# Patient Record
(demographics unavailable — no encounter records)

---

## 2025-05-01 NOTE — PHYSICAL EXAM
[Normal] : no acute distress, well nourished, well developed and well-appearing [de-identified] : Cervical spinous process point tenderness at the higher levels of her C-spine.  Cervical flexion extension does not provoke Lhermitte's phenomenon though she notes pain radiating to her mid back in flexion.  Spurling test negative for cervical radiculopathy.   strength is reduced bilaterally, worse on the right. [de-identified] : Patient points to her right trapezius as a source for pain.  Passive abduction external rotation is mildly uncomfortable but does not appear to be a primary pain generator. Patient endorses pain with logroll of the bilateral lower extremities though straight leg raise does not clearly provoke sciatica.Bilateral trochanteric tenderness. [de-identified] : Symmetrically diminished reflexes in bilateral lower extremities.  Symmetric sensation bilaterally in her feet in the L4 L5-S1 distributions.  Some proximal improvement in monofilament sensitivity.

## 2025-05-01 NOTE — HEALTH RISK ASSESSMENT
[No] : In the past 12 months have you used drugs other than those required for medical reasons? No [No falls in past year] : Patient reported no falls in the past year [0] : 2) Feeling down, depressed, or hopeless: Not at all (0) [PHQ-2 Negative - No further assessment needed] : PHQ-2 Negative - No further assessment needed [Never] : Never [Patient reported mammogram was normal] : Patient reported mammogram was normal [Patient reported PAP Smear was normal] : Patient reported PAP Smear was normal [HIV Test offered] : HIV Test offered [Hepatitis C test offered] : Hepatitis C test offered [None] : None [With Family] : lives with family [Retired] : retired [High School] : high school [] :  [Feels Safe at Home] : Feels safe at home [Fully functional (bathing, dressing, toileting, transferring, walking, feeding)] : Fully functional (bathing, dressing, toileting, transferring, walking, feeding) [Fully functional (using the telephone, shopping, preparing meals, housekeeping, doing laundry, using] : Fully functional and needs no help or supervision to perform IADLs (using the telephone, shopping, preparing meals, housekeeping, doing laundry, using transportation, managing medications and managing finances) [Reports normal functional visual acuity (ie: able to read med bottle)] : Reports normal functional visual acuity [Smoke Detector] : smoke detector [Carbon Monoxide Detector] : carbon monoxide detector [Safety elements used in home] : safety elements used in home [Seat Belt] :  uses seat belt [Audit-CScore] : 0 [de-identified] : housework [de-identified] : ok [[facility]] : 10 [[id number]] : 0 [Change in mental status noted] : No change in mental status noted [Language] : denies difficulty with language [Behavior] : denies difficulty with behavior [Learning/Retaining New Information] : denies difficulty learning/retaining new information [Handling Complex Tasks] : denies difficulty handling complex tasks [Reasoning] : denies difficulty with reasoning [Spatial Ability and Orientation] : denies difficulty with spatial ability and orientation [Sexually Active] : not sexually active [High Risk Behavior] : no high risk behavior [Reports changes in hearing] : Reports no changes in hearing [Reports changes in vision] : Reports no changes in vision [Reports changes in dental health] : Reports no changes in dental health [Sunscreen] : does not use sunscreen [Travel to Developing Areas] : does not  travel to developing areas [TB Exposure] : is not being exposed to tuberculosis [Caregiver Concerns] : does not have caregiver concerns [MammogramComments] : overdue for all [ColonoscopyComments] : caridad [de-identified] : language. 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

## 2025-05-01 NOTE — ASSESSMENT
[Vaccines Reviewed] : Immunizations reviewed today. Please see immunization details in the vaccine log within the immunization flowsheet.  [FreeTextEntry1] : Most pleasant non-English-speaking female without medical care since 2018, with multipleChronic pain generators likely cervical, muscular, bursal, lumbar possibly hip SI joint, and several other untreated significant health care issues with potential for significant adverse impact on quality of life.  *Burning paresthesia. Central impingement vs peripheral neuropathy. Start with plain Lumbosacral w flex/ext,  Ig A1c hold off on fat pad biopsy. Ref Ortho spine. May need emg.  *B arm pain. Weak  strength. Pain in trapezius likely needs PT, but let Ortho eval first. Old MRI showed L c5/6 severe impingement, interval progression and/or Carpal tunnel likely. Ref Ortho spine, prob need update MRI and/or EMG. Did not order re imaging of Cspine or shoulder.  *Chronic Right elbow pain. Did not discuss today, nvr imaged. Start with inflammatory arthropathy work up. Examine on return.  *Cystic left thyroid nodule. Overdue for f/u, 2018 rpt did not classify in terms of PIRADS. U/S ordered.   *Cervical cancer screening.  *High risk HPV thin prep, 2016. . Overdue for pap. Will refer upon pt return. Saw Dr Carias in past.  *Severe hyperlipidemia. Nonsmoker, normotensive, but sedentary with prediabetes. Update with Lpa, discuss cardiology referral on rtn to scrn for subclin CAD.  *Chronic abdominal pain. Did not come up today, address at follow up. Fatty liver without cirrhosis on abd US 2017, no other findings of note. Given ppi in past, uncertain impact. Nvr EGD.  *Hepatic steatosis. CBC CMP for FIB4 to det if elastogram indicated.  *Chronic epigastric pain.  U/S 2017 fatty liver o/w unremarkable.  *Chronic daily headache. Saw neurology, prescribed TCA with improvement. Had patchy right mastoiditis on scan 2017, Dr Ellis did not feel treatment indicated, and indeed not called on f/u imaging. Palpate on rtn. Abnl head MRI with disproportionately prominent frontal small vessel disease findings, prompted murtaza re scan without findings to suggest demyelinating process, fortunately.  *Latent TB. Evidently treated.  *Osteopenia. DEXA  -1.5spine, -1. hip rec'd fosamax-> 2017 -0.5s/-0.2 hip. Pursue DEXA on rtn, check vit D levels.  *Prediabetes.  Weight up approximately 10 pounds since last seen in 2018.  Update A1c obtain urine albumin.  *Routine adult health maintenance. Vaccinations: Eligible for Tdap Prevnar 20 Shingrix.  Received a flu shot this year but no COVID booster. Colon cancer screening: Never.  Inquire as to family history.  Discussed colonoscopy versus Cologuard on return. Breast cancer screening: No family history of breast cancer.  Mammogram breast ultrasound ordered, last checked 7 years ago. Cervical cancer screening: HPV positive.  See above. Osteoporosis screening: See above. Update health screening labs. Return for exam report visit and continue to address numerous outstanding issues. Time 60 minutes

## 2025-05-01 NOTE — PHYSICAL EXAM
[Normal] : no acute distress, well nourished, well developed and well-appearing [de-identified] : Cervical spinous process point tenderness at the higher levels of her C-spine.  Cervical flexion extension does not provoke Lhermitte's phenomenon though she notes pain radiating to her mid back in flexion.  Spurling test negative for cervical radiculopathy.   strength is reduced bilaterally, worse on the right. [de-identified] : Patient points to her right trapezius as a source for pain.  Passive abduction external rotation is mildly uncomfortable but does not appear to be a primary pain generator. Patient endorses pain with logroll of the bilateral lower extremities though straight leg raise does not clearly provoke sciatica.Bilateral trochanteric tenderness. [de-identified] : Symmetrically diminished reflexes in bilateral lower extremities.  Symmetric sensation bilaterally in her feet in the L4 L5-S1 distributions.  Some proximal improvement in monofilament sensitivity.

## 2025-05-01 NOTE — HISTORY OF PRESENT ILLNESS
[FreeTextEntry1] : Neck pain back pain burning feet [de-identified] : Most pleasant 69-year-old non-English-speaking  female with history of bilateral cervical radiculopathy with severe left C5-C6 impingement on 2018 cervical MRI who comes as new patient to [facility] having regained insurance, after a 7-year absence from any medical care.  Her son serves as .  She reports neck, Right shoulder and low back pain.  The back pain is worsened with lumbar extension.  No saddle anesthesia incontinence.  The neck pain apparently radiates down both arms, prompted neuro MRI neck 2018.   Of concern she also notes burning in her feet, longstanding LBP and prediabetes.

## 2025-05-01 NOTE — HISTORY OF PRESENT ILLNESS
[FreeTextEntry1] : Neck pain back pain burning feet [de-identified] : Most pleasant 69-year-old non-English-speaking  female with history of bilateral cervical radiculopathy with severe left C5-C6 impingement on 2018 cervical MRI who comes as new patient to [facility] having regained insurance, after a 7-year absence from any medical care.  Her son serves as .  She reports neck, Right shoulder and low back pain.  The back pain is worsened with lumbar extension.  No saddle anesthesia incontinence.  The neck pain apparently radiates down both arms, prompted neuro MRI neck 2018.   Of concern she also notes burning in her feet, longstanding LBP and prediabetes.

## 2025-05-01 NOTE — HEALTH RISK ASSESSMENT
[No] : In the past 12 months have you used drugs other than those required for medical reasons? No [No falls in past year] : Patient reported no falls in the past year [0] : 2) Feeling down, depressed, or hopeless: Not at all (0) [PHQ-2 Negative - No further assessment needed] : PHQ-2 Negative - No further assessment needed [Never] : Never [Patient reported mammogram was normal] : Patient reported mammogram was normal [Patient reported PAP Smear was normal] : Patient reported PAP Smear was normal [HIV Test offered] : HIV Test offered [Hepatitis C test offered] : Hepatitis C test offered [None] : None [With Family] : lives with family [Retired] : retired [High School] : high school [] :  [Feels Safe at Home] : Feels safe at home [Fully functional (bathing, dressing, toileting, transferring, walking, feeding)] : Fully functional (bathing, dressing, toileting, transferring, walking, feeding) [Fully functional (using the telephone, shopping, preparing meals, housekeeping, doing laundry, using] : Fully functional and needs no help or supervision to perform IADLs (using the telephone, shopping, preparing meals, housekeeping, doing laundry, using transportation, managing medications and managing finances) [Reports normal functional visual acuity (ie: able to read med bottle)] : Reports normal functional visual acuity [Smoke Detector] : smoke detector [Carbon Monoxide Detector] : carbon monoxide detector [Safety elements used in home] : safety elements used in home [Seat Belt] :  uses seat belt [Audit-CScore] : 0 [de-identified] : housework [de-identified] : ok [[facility]] : 10 [AWB0Orsoc] : 0 [Change in mental status noted] : No change in mental status noted [Language] : denies difficulty with language [Behavior] : denies difficulty with behavior [Learning/Retaining New Information] : denies difficulty learning/retaining new information [Handling Complex Tasks] : denies difficulty handling complex tasks [Reasoning] : denies difficulty with reasoning [Spatial Ability and Orientation] : denies difficulty with spatial ability and orientation [Sexually Active] : not sexually active [High Risk Behavior] : no high risk behavior [Reports changes in hearing] : Reports no changes in hearing [Reports changes in vision] : Reports no changes in vision [Reports changes in dental health] : Reports no changes in dental health [Sunscreen] : does not use sunscreen [Travel to Developing Areas] : does not  travel to developing areas [TB Exposure] : is not being exposed to tuberculosis [Caregiver Concerns] : does not have caregiver concerns [MammogramComments] : overdue for all [ColonoscopyComments] : caridad [de-identified] : language. 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

## 2025-05-01 NOTE — PHYSICAL EXAM
[Normal] : no acute distress, well nourished, well developed and well-appearing [de-identified] : Cervical spinous process point tenderness at the higher levels of her C-spine.  Cervical flexion extension does not provoke Lhermitte's phenomenon though she notes pain radiating to her mid back in flexion.  Spurling test negative for cervical radiculopathy.   strength is reduced bilaterally, worse on the right. [de-identified] : Patient points to her right trapezius as a source for pain.  Passive abduction external rotation is mildly uncomfortable but does not appear to be a primary pain generator. Patient endorses pain with logroll of the bilateral lower extremities though straight leg raise does not clearly provoke sciatica.Bilateral trochanteric tenderness. [de-identified] : Symmetrically diminished reflexes in bilateral lower extremities.  Symmetric sensation bilaterally in her feet in the L4 L5-S1 distributions.  Some proximal improvement in monofilament sensitivity.

## 2025-05-01 NOTE — HISTORY OF PRESENT ILLNESS
[FreeTextEntry1] : Neck pain back pain burning feet [de-identified] : Most pleasant 69-year-old non-English-speaking  female with history of bilateral cervical radiculopathy with severe left C5-C6 impingement on 2018 cervical MRI who comes as new patient to [facility] having regained insurance, after a 7-year absence from any medical care.  Her son serves as .  She reports neck, Right shoulder and low back pain.  The back pain is worsened with lumbar extension.  No saddle anesthesia incontinence.  The neck pain apparently radiates down both arms, prompted neuro MRI neck 2018.   Of concern she also notes burning in her feet, longstanding LBP and prediabetes.

## 2025-05-01 NOTE — ASSESSMENT
[Vaccines Reviewed] : Immunizations reviewed today. Please see immunization details in the vaccine log within the immunization flowsheet.  [FreeTextEntry1] : Most pleasant non-English-speaking female without medical care since 2018, with multipleChronic pain generators likely cervical, muscular, bursal, lumbar possibly hip SI joint, and several other untreated significant health care issues with potential for significant adverse impact on quality of life.  *Burning paresthesia. Central impingement vs peripheral neuropathy. Start with plain Lumbosacral w flex/ext,  Ig A1c hold off on fat pad biopsy. Ref Ortho spine. May need emg.  *B arm pain. Weak  strength. Pain in trapezius likely needs PT, but let Ortho eval first. Old MRI showed L c5/6 severe impingement, interval progression and/or Carpal tunnel likely. Ref Ortho spine, prob need update MRI and/or EMG. Did not order re imaging of Cspine or shoulder.  *Chronic Right elbow pain. Did not discuss today, nvr imaged. Start with inflammatory arthropathy work up. Examine on return.  *Cystic left thyroid nodule. Overdue for f/u, 2018 rpt did not classify in terms of PIRADS. U/S ordered.   *Cervical cancer screening.  *High risk HPV thin prep, 2016. . Overdue for pap. Will refer upon pt return. Saw Dr Carias in past.  *Severe hyperlipidemia. Nonsmoker, normotensive, but sedentary with prediabetes. Update with Lpa, discuss cardiology referral on rtn to scrn for subclin CAD.  *Chronic abdominal pain. Did not come up today, address at follow up. Fatty liver without cirrhosis on abd US 2017, no other findings of note. Given ppi in past, uncertain impact. Nvr EGD.  *Hepatic steatosis. CBC CMP for FIB4 to det if elastogram indicated.  *Chronic epigastric pain.  U/S 2017 fatty liver o/w unremarkable.  *Chronic daily headache. Saw neurology, prescribed TCA with improvement. Had patchy right mastoiditis on scan 2017, Dr Ellis did not feel treatment indicated, and indeed not called on f/u imaging. Palpate on rtn. Abnl head MRI with disproportionately prominent frontal small vessel disease findings, prompted murtaza re scan without findings to suggest demyelinating process, fortunately.  *Latent TB. Evidently treated.  *Osteopenia. DEXA  -1.5spine, -1. hip rec'd fosamax-> 2017 -0.5s/-0.2 hip. Pursue DEXA on rtn, check vit D levels.  *Prediabetes.  Weight up approximately 10 pounds since last seen in 2018.  Update A1c obtain urine albumin.  *Routine adult health maintenance. Vaccinations: Eligible for Tdap Prevnar 20 Shingrix.  Received a flu shot this year but no COVID booster. Colon cancer screening: Never.  Inquire as to family history.  Discussed colonoscopy versus Cologuard on return. Breast cancer screening: No family history of breast cancer.  Mammogram breast ultrasound ordered, last checked 7 years ago. Cervical cancer screening: HPV positive.  See above. Osteoporosis screening: See above. Update health screening labs. Return for exam report visit and continue to address numerous outstanding issues. Time 60 minutes     Bactrim Counseling:  I discussed with the patient the risks of sulfa antibiotics including but not limited to GI upset, allergic reaction, drug rash, diarrhea, dizziness, photosensitivity, and yeast infections.  Rarely, more serious reactions can occur including but not limited to aplastic anemia, agranulocytosis, methemoglobinemia, blood dyscrasias, liver or kidney failure, lung infiltrates or desquamative/blistering drug rashes.

## 2025-05-01 NOTE — HEALTH RISK ASSESSMENT
[No] : In the past 12 months have you used drugs other than those required for medical reasons? No [No falls in past year] : Patient reported no falls in the past year [0] : 2) Feeling down, depressed, or hopeless: Not at all (0) [PHQ-2 Negative - No further assessment needed] : PHQ-2 Negative - No further assessment needed [Never] : Never [Patient reported mammogram was normal] : Patient reported mammogram was normal [Patient reported PAP Smear was normal] : Patient reported PAP Smear was normal [HIV Test offered] : HIV Test offered [Hepatitis C test offered] : Hepatitis C test offered [None] : None [With Family] : lives with family [Retired] : retired [High School] : high school [] :  [Feels Safe at Home] : Feels safe at home [Fully functional (bathing, dressing, toileting, transferring, walking, feeding)] : Fully functional (bathing, dressing, toileting, transferring, walking, feeding) [Fully functional (using the telephone, shopping, preparing meals, housekeeping, doing laundry, using] : Fully functional and needs no help or supervision to perform IADLs (using the telephone, shopping, preparing meals, housekeeping, doing laundry, using transportation, managing medications and managing finances) [Reports normal functional visual acuity (ie: able to read med bottle)] : Reports normal functional visual acuity [Smoke Detector] : smoke detector [Carbon Monoxide Detector] : carbon monoxide detector [Safety elements used in home] : safety elements used in home [Seat Belt] :  uses seat belt [Audit-CScore] : 0 [de-identified] : housework [de-identified] : ok [[facility]] : 10 [EXB1Mvlrm] : 0 [Change in mental status noted] : No change in mental status noted [Language] : denies difficulty with language [Behavior] : denies difficulty with behavior [Learning/Retaining New Information] : denies difficulty learning/retaining new information [Handling Complex Tasks] : denies difficulty handling complex tasks [Reasoning] : denies difficulty with reasoning [Spatial Ability and Orientation] : denies difficulty with spatial ability and orientation [Sexually Active] : not sexually active [High Risk Behavior] : no high risk behavior [Reports changes in hearing] : Reports no changes in hearing [Reports changes in vision] : Reports no changes in vision [Reports changes in dental health] : Reports no changes in dental health [Sunscreen] : does not use sunscreen [Travel to Developing Areas] : does not  travel to developing areas [TB Exposure] : is not being exposed to tuberculosis [Caregiver Concerns] : does not have caregiver concerns [MammogramComments] : overdue for all [ColonoscopyComments] : caridad [de-identified] : language. 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

## 2025-05-08 NOTE — CONSULT LETTER
[Dear  ___] : Dear  [unfilled], [Courtesy Letter:] : I had the pleasure of seeing your patient, [unfilled], in my office today. [Please see my note below.] : Please see my note below. [Sincerely,] : Sincerely, [FreeTextEntry2] : Claude Bridges, MD ([facility]) [FreeTextEntry3] : Oralia Gonzáles, YARI, PAAbiC Sr. Physician Assistant, Otolaryngology at [facility] Adjunct Clinical Instructor, Department of Physician Assistant Studies, [facility] [address] [address]

## 2025-05-08 NOTE — PROCEDURE
[Epistaxis] : evaluation of epistaxis [Topical Lidocaine] : topical lidocaine [Oxymetazoline HCl] : oxymetazoline HCl [Flexible Endoscope] : examined with the flexible endoscope [Serial Number: ___] : Serial Number: [unfilled] [Normal] : the nasal mucosa was normal [Nasal Septum Mucosa Bleeding Right] : bleeding on the right [Cauterized w/Silver Nitrate] : the bleeding was cauterized with silver nitrate [FreeTextEntry2] : Mild bleeding was seen in the right septum cauterized with silver nitrate.

## 2025-05-08 NOTE — PHYSICAL EXAM
[de-identified] : Rapid rhino in right nostril, inflated.  No excessive bleeding. [Midline] : trachea located in midline position [Normal] : no rashes

## 2025-05-08 NOTE — HISTORY OF PRESENT ILLNESS
[de-identified] : Ms. SALINAS is a 69-year-old female who presents for packing removal for recurrent epistaxis.  She is accompanied by her son, who reports that bleeding started this past Saturday. They presented to the ED, where bleeding was controlled.  This past Tuesday, May 6, she had another epistaxis episode and returned to the ED, where her right nostril was packed with rapid rhinoplasty.  She was seen by the med in the ED that day briefly to confirm hemostasis, and a follow-up appointment for today was arranged.  She denies any excessive bleeding since Tuesday and noted some discomfort in her right nostril due to the rapid rhinorrhea.

## 2025-05-13 NOTE — PHYSICAL EXAM
[Normal] : affect was normal and insight and judgment were intact [de-identified] : healing wound in right nares, no active bleeding.

## 2025-05-13 NOTE — ASSESSMENT
[FreeTextEntry1] : Most pleasant medically complicated non-English-speaking female without medical care since 2018, with multiple chronic pain generators likely cervical, muscular, bursal, lumbar possibly hip SI joint, and several other untreated significant health care issues (HLD HTN MAFLD PreDM, paresthesia, thyroid nodule) with potential for significant adverse impact on quality of life.  *Epistaxis.  Chronic NSAIDS for pain. Inconsistent history regarding allergic rhinitis history which shouldn't cause epistaxis. Plats adequate.  Wonder if labile hypertension, reactive vs primary, clearly was markedly elevated  while tachycardic as well, on both ED visits, now essentially normal on very small dose metoprolol.  TSH normal,  On Augmentin to reduce risk of infection, tolerating from a GI point of view.  No overt infection, extensive right nostril tissue injury from silver nitrate, possibly penetrating as 1 small spot to the left nares.  Patient wanted something for clear nasal drainage, indicated that we would not want to dry the area up or apply a steroid as it is trying to heal.  No consistent allergy history. Pheo work up, possible zios, and check coags.  *Hypertension,reactive versus essential. R/O PSVT. in contrast to blood pressure on both of our recent clinic visits, blood pressure in the ER was markedly elevated 297-208/ but was tachy at 110-124. See discussion under epistaxis. Will continue low dose beta blker, and given microalbuminuria, add losartan 25mg daily.  *Burning paresthesia. Central impingement vs peripheral neuropathy. Start with plain Lumbosacral w flex/ext, Ig polyclonal,  A1c 6.4%  hold off on fat pad biopsy. Has Ref Ortho spine. May need emg.  *B arm pain. Weak  strength. Pain in trapezius likely needs PT, but let Ortho eval first. Old MRI showed L c5/6 severe impingement, interval progression and/or Carpal tunnel likely. Ref Ortho spine, prob need update MRI and/or EMG. Did not order re imaging of Cspine or shoulder.  *Chronic Right elbow pain. Did not discuss today, nvr imaged. RF MELY CRP normal, isolated ESR 39 ?polyclonal gammaglobulinemia effect. Examine on return.  *Cystic left thyroid nodule. Overdue for f/u, 2018 rpt did not classify in terms of PIRADS. TSH normal. U/S ordered.  *Cervical cancer screening. *High risk HPV thin prep, 2016. . Overdue for pap. Will refer upon pt return. Saw Dr Carias in past.  *Severe hyperlipidemia likely long standing, untreated. Low Lpa. Nonsmoker, reactive hypertensive, but sedentary with prediabetes.Suggested cardiology referral to scrn for subclin CAD. Initiate high intensity statin.  *Chronic abdominal pain. Did not come up today, address at follow up. Fatty liver without cirrhosis on abd US 2017, no other findings of note. Given ppi in past, uncertain impact. Nvr EGD.  *Hepatic steatosis. FIB4 =2.1, will need elastogram, liver referral in future.  *Chronic epigastric pain. U/S 2017 fatty liver o/w unremarkable. Pursue further at follow up.  *Chronic daily headache. Saw neurology, prescribed TCA with improvement. Had patchy right mastoiditis on scan 2017, Dr Ellis did not feel treatment indicated, and indeed not called on f/u imaging. Palpate on rtn. Abnl head MRI with disproportionately prominent frontal small vessel disease findings, prompted murtaza re scan without findings to suggest demyelinating process, fortunately.  *Latent TB. Evidently treated.  *Osteopenia. DEXA  -1.5spine, -1. hip rec'd fosamax-> 2017 -0.5s/-0.2 hip. Pursue DEXA on rtn, asked to start 2000iu d3 daily,   *Prediabetes. Weight up approximately 10 pounds since last seen in 2018, 5.7 carloz to 6.4%. Has albuminuria. Start losartan 25mg daily  *Routine adult health maintenance. Vaccinations: Eligible for Tdap Prevnar 20 Shingrix. Received a flu shot this year but no COVID booster. Colon cancer screening: Never. Inquire as to family history. Discuss colonoscopy versus Cologuard on return. Breast cancer screening: No family history of breast cancer. Mammogram breast ultrasound ordered, last checked 7 years ago. Not yet scheduled. Cervical cancer screening: HPV positive. See above. Osteoporosis screening: See above. Updated health screening labs neg hbv hcv hiv. Return for exam report visit and continue to address numerous outstanding issues. Time 45min

## 2025-05-13 NOTE — PHYSICAL EXAM
[Normal] : affect was normal and insight and judgment were intact [de-identified] : healing wound in right nares, no active bleeding.

## 2025-05-13 NOTE — HISTORY OF PRESENT ILLNESS
[FreeTextEntry1] : ER follow-up.  Report visit. [de-identified] : Most pleasant 69-year-old non-English-speaking  female with history of bilateral cervical radiculopathy with severe left C5-C6 impingement on 2018 cervical MRI who recently established care after a 7-year absence from any medical care, who comes in for rpt visit and ED follow up.  Patient was seen in the ER for recurrent epistaxis on May 3, blood pressure 197/93, pulse 124.  EKG not done.  She was seen again for repeat May 6, blood pressure at that time 208/106 pulse again 110.  She was not having substantial pain from the nosebleed no antecedent trauma.  She has seen ENT in the nosebleed has been cauterized with silver nitrate after balloon compression failed to work.  She was started on low-dose metoprolol in the ER.  Her son serves as . At initial visit, patient reported She reports neck, Right shoulder and low back pain.  The back pain is worsened with lumbar extension.  No saddle anesthesia incontinence.  The neck pain apparently radiates down both arms, prompted neuro MRI neck 2018.   Of concern she also notes burning in her feet, longstanding LBP and prediabetes.

## 2025-05-14 NOTE — PROCEDURE
[Epistaxis] : evaluation of epistaxis [Topical Lidocaine] : topical lidocaine [Oxymetazoline HCl] : oxymetazoline HCl [Flexible Endoscope] : examined with the flexible endoscope [Serial Number: ___] : Serial Number: [unfilled] [Deviated to the Rt] : deviated to the right [Nasal Septum Mucosa Bleeding Right] : bleeding on the right [Cauterized w/Silver Nitrate] : the bleeding was cauterized with silver nitrate [Normal] : the nasopharynx was normal [FreeTextEntry2] : Mild bleeding seen in right anterior septum, cauterized with silver nitrate.  [de-identified] : Mild bleeding seen in middle meatus, suctioned, sprayed with Afrin

## 2025-05-14 NOTE — CONSULT LETTER
[Dear  ___] : Dear  [unfilled], [Courtesy Letter:] : I had the pleasure of seeing your patient, [unfilled], in my office today. [Please see my note below.] : Please see my note below. [Sincerely,] : Sincerely, [FreeTextEntry2] : Claude Bridges MD [FreeTextEntry3] : Oralia Gonzáles, YARI, PAAbiC Sr. Physician Assistant, Otolaryngology at [facility] Adjunct Clinical Instructor, Department of Physician Assistant Studies, [facility] [address] [address]

## 2025-05-14 NOTE — REASON FOR VISIT
[Subsequent Evaluation] : a subsequent evaluation for [FreeTextEntry2] : epistaxis and post-nasal drip [Family Member] : family member

## 2025-05-14 NOTE — PHYSICAL EXAM
[Nasal Endoscopy Performed] : nasal endoscopy was performed, see procedure section for findings [] : septum deviated to the right [Midline] : trachea located in midline position [Normal] : no rashes [de-identified] : Dried mucous in right nare- removed

## 2025-05-14 NOTE — HISTORY OF PRESENT ILLNESS
[de-identified] :  Ms. SALINAS is a 69-year-old female PMHx HTN who presents for subsequent evaluation of recurrent epistaxisis, she is accompanied by her son who is translating in Cymro. She has not had any episodes of epistaxis since last visit and has been using the saline spray which has helped with dryness. Noted some right sided facial presssure/pain, nasal congestion, and yellow post-nasal drip since rapid rhino was removed, but it is improving. She feels her right nostril is slightly blocked with mucous. Denies recent sick contact/illness, fever, chills, SOB, dizziness, headache.

## 2025-05-27 NOTE — PHYSICAL EXAM
[de-identified] : Patient is WDWN, alert, and in no acute distress. Breathing is unlabored. She is grossly oriented to person, place, and time.  She is accompanied by her daughter and using a .    Right Shoulder: Inspection/ Palpation: there is no tenderness, swelling, or deformities. Range of Motion: Full ROM with pain Strength: forward elevation, internal rotation, external rotation, adduction, and abduction are 5/5. Stability: no joint instability on provocative testing.  Full ROM of neck Right foot: Inspection/Palpation: No tenderness, edema, or deformities. Range of Motion: Full Stability: Joint stability is intact. Full range of motion in toes. Strength Numbness in little toe.   Right Hand -  Inspection/Palpation: Tenderness. Mild edema. No ecchymosis. No deformities. No discoloration.  Stability: No joint instability on provocative testing. Range of Motion: Strength:  middle and ring finger numbness  [de-identified] : ACC: [id number] EXAM: XR LS SPINE FLEX EXT MIN 4V ORDERED BY: CLAUDE CHITRA PACO  PROCEDURE DATE: 05/21/2025  INTERPRETATION: Radiographs of the lumbar spine CPT 03549  CLINICAL INFORMATION: Low back pain  TECHNIQUE: Frontal and lateral views of the lumbar spine, flexion and extension views, and lateral coned-down view of the lumbosacral junction were obtained.  FINDINGS: No previous examinations are available for review.  No gross vertebral body fracture is demonstrated. Significant degenerative changes with osteophyte formation, disc space narrowing, and endplate sclerosis are seen involving the L5-S1 level. There is no evidence for subluxation upon flexion or extension imaging. Lordosis is maintained in the neutral position. The sacroiliac joints appear symmetric bilaterally.  Incidental note is made of calcification of the distal abdominal aorta.  IMPRESSION: Degenerative changes noted at the L5-S1 level as noted above.   --- End of Report ---  SUNITA GALLEGOS MD; Attending Radiologist This document has been electronically signed. May 22 2025 10:08AM

## 2025-05-27 NOTE — DISCUSSION/SUMMARY
[de-identified] : The underlying pathophysiology was reviewed with the patient. XR films were reviewed with the patient. Discussed at length the nature of the patients condition. The shoulder, neck, low back symptoms are secondary to arthritis at the L5-S1 level.   Treatment options were discussed including; observation, NSAIDS, analgesics, EMG, physical therapy.   RX: Pt for the neck script give cervical and lumbar spine   The patient wishes to proceed with an EMG for rt hand and rt foot numbness. A script was given.  Patient was advised to try OTC medication and topical analgesics for pain management. I recommend that the patient utilize Tylenol, Advil, Aleve, Voltaren gel, Icy Hot, Biofreeze, Bengay, or 4% lidocaine patches.  All questions answered, understanding verbalized. Patient in agreement with plan of care. The patient can follow-up after EMG results. If the patient begins to experience any changes or severe exacerbation of her symptoms, she should reach out to me as soon as possible.

## 2025-05-27 NOTE — HISTORY OF PRESENT ILLNESS
Please let the patient to submit E-visit for adjusting the dose of her medication - as instructed in her last OV with me. Will need to know on the E-visit how is her ADHD and her current dosage of Strattera she is taking.                [de-identified] : Ms. MIN SALINAS is a 69 year old RHD woman presents today for evaluation of chronic right sided shoulder and back pain x years ago. She had x-rays of the lumbar spine at a [facility] facility on 5/21/25. Denies injury/trauma. She notes right shoulder pain with radicular symptoms and numbness/tingling to the fingers. She also has been experiencing right low back pain with radicular symptoms to the toes. She has not tried PT at this time. She had an MRI of the cervical spine in 2018. She is taking Ibuprofen for the pain with moderate relief. She notes weakness of both the right upper and right lower extremities.   She is using a .

## 2025-05-27 NOTE — ADDENDUM
[FreeTextEntry1] : I, Horacio Aguero wrote this note acting as a scribe for Dr. Davide Pastrana on 05/27/2025.   All medical record entries made by the Scribe were at my, Dr. Davide Pastrana MD., direction and personally dictated by me on 05/27/2025. I have personally reviewed the chart and agree that the record accurately reflects my personal performance of the history, physical exam, assessment and plan.